# Patient Record
Sex: FEMALE | Race: BLACK OR AFRICAN AMERICAN | Employment: OTHER | URBAN - METROPOLITAN AREA
[De-identification: names, ages, dates, MRNs, and addresses within clinical notes are randomized per-mention and may not be internally consistent; named-entity substitution may affect disease eponyms.]

---

## 2021-03-02 ENCOUNTER — IMMUNIZATION (OUTPATIENT)
Dept: PRIMARY CARE CLINIC | Age: 66
End: 2021-03-02
Payer: MEDICARE

## 2021-03-02 PROCEDURE — 91301 COVID-19, MODERNA VACCINE 100MCG/0.5ML DOSE: CPT | Performed by: FAMILY MEDICINE

## 2021-03-02 PROCEDURE — 0011A COVID-19, MODERNA VACCINE 100MCG/0.5ML DOSE: CPT | Performed by: FAMILY MEDICINE

## 2021-03-30 ENCOUNTER — IMMUNIZATION (OUTPATIENT)
Dept: PRIMARY CARE CLINIC | Age: 66
End: 2021-03-30
Payer: MEDICARE

## 2021-03-30 PROCEDURE — 0012A COVID-19, MODERNA VACCINE 100MCG/0.5ML DOSE: CPT | Performed by: FAMILY MEDICINE

## 2021-03-30 PROCEDURE — 91301 COVID-19, MODERNA VACCINE 100MCG/0.5ML DOSE: CPT | Performed by: FAMILY MEDICINE

## 2022-04-17 ENCOUNTER — HOSPITAL ENCOUNTER (EMERGENCY)
Age: 67
Discharge: HOME | End: 2022-04-17
Attending: EMERGENCY MEDICINE
Payer: MEDICARE

## 2022-04-17 VITALS
RESPIRATION RATE: 16 BRPM | HEART RATE: 58 BPM | TEMPERATURE: 97.6 F | SYSTOLIC BLOOD PRESSURE: 140 MMHG | DIASTOLIC BLOOD PRESSURE: 83 MMHG | OXYGEN SATURATION: 98 %

## 2022-04-17 DIAGNOSIS — S61.218A LACERATION OF MIDDLE FINGER WITHOUT FOREIGN BODY WITHOUT DAMAGE TO NAIL, UNSPECIFIED LATERALITY, INITIAL ENCOUNTER: Primary | ICD-10-CM

## 2022-04-17 PROCEDURE — 12001 RPR S/N/AX/GEN/TRNK 2.5CM/<: CPT

## 2022-04-17 PROCEDURE — 99283 EMERGENCY DEPT VISIT LOW MDM: CPT

## 2022-04-17 RX ORDER — CEPHALEXIN 500 MG/1
500 CAPSULE ORAL 3 TIMES DAILY
Qty: 15 CAPSULE | Refills: 0 | Status: SHIPPED | OUTPATIENT
Start: 2022-04-17 | End: 2022-04-17 | Stop reason: SDUPTHER

## 2022-04-17 RX ORDER — CEPHALEXIN 500 MG/1
500 CAPSULE ORAL 3 TIMES DAILY
Qty: 15 CAPSULE | Refills: 0 | Status: SHIPPED | OUTPATIENT
Start: 2022-04-17 | End: 2022-04-22

## 2022-04-17 NOTE — ED NOTES
Finger wrapped with gauze and adaptic. Discharge instruction reviewed with pt.      Aimee Flowers, KHUSHBOO  04/17/22 2963

## 2022-04-17 NOTE — Clinical Note
Adelso Kirkland was seen and treated in our emergency department on 4/17/2022. She may return to work on 04/19/2022. If you have any questions or concerns, please don't hesitate to call.       Prashanth De La Cruz MD

## 2022-04-17 NOTE — ED PROVIDER NOTES
[address] ED      CHIEF COMPLAINT  Laceration (patient states she cut her middle finger on a kitchen knife this morning while cutting potatoes)       HISTORY OF PRESENT ILLNESS  Fariba Gallego is a 78 y.o. female with history of diabetes, hypertension, and hyperlipidemia who presents to the emergency department for evaluation of laceration. Patient reports she was cutting potatoes and accidentally cut the right middle finger on the volar surface with a kitchen knife. Says it was a clean knife. Says she was concerned because the bleeding would stop and came for evaluation. Denies taking blood thinners or aspirin. Denies any other injuries. Does not recall the Tdap but thinks it was within the past few years. No other complaints, modifying factors or associated symptoms. I have reviewed the following from the nursing documentation. Past Medical History:   Diagnosis Date    Diabetes mellitus ([facility] [address]     Hyperlipidemia     Hypertension      Past Surgical History:   Procedure Laterality Date    UTERINE FIBROID SURGERY      WISDOM TOOTH EXTRACTION       History reviewed. No pertinent family history.   Social History     Socioeconomic History    Marital status:      Spouse name: Not on file    Number of children: Not on file    Years of education: Not on file    Highest education level: Not on file   Occupational History    Not on file   Tobacco Use    Smoking status: Never Smoker    Smokeless tobacco: Never Used   Substance and Sexual Activity    Alcohol use: No    Drug use: No    Sexual activity: Not on file   Other Topics Concern    Not on file   Social History Narrative    Not on file     Social Determinants of Health     Financial Resource Strain:     Difficulty of Paying Living Expenses: Not on file   Food Insecurity:     Worried About Running Out of Food in the Last Year: Not on file    Ankita of Food in the Last Year: Not on file   Transportation Needs:     Lack of Transportation (Medical): Not on file    Lack of Transportation (Non-Medical): Not on file   Physical Activity:     Days of Exercise per Week: Not on file    Minutes of Exercise per Session: Not on file   Stress:     Feeling of Stress : Not on file   Social Connections:     Frequency of Communication with Friends and Family: Not on file    Frequency of Social Gatherings with Friends and Family: Not on file    Attends Protestant Services: Not on file    Active Member of [address] [place] [address] [employer] or Organizations: Not on file    Attends Club or Organization Meetings: Not on file    Marital Status: Not on file   Intimate Partner Violence:     Fear of Current or Ex-Partner: Not on file    Emotionally Abused: Not on file    Physically Abused: Not on file    Sexually Abused: Not on file   Housing Stability:     Unable to Pay for Housing in the Last Year: Not on file    Number of [place] in the Last Year: Not on file    Unstable Housing in the Last Year: Not on file     No current facility-administered medications for this encounter. Current Outpatient Medications   Medication Sig Dispense Refill    cephALEXin (KEFLEX) 500 MG capsule Take 1 capsule by mouth 3 times daily for 5 days 15 capsule 0    pravastatin (PRAVACHOL) 10 MG tablet Take 10 mg by mouth daily      metFORMIN (GLUCOPHAGE) 1000 MG tablet Take 1,000 mg by mouth 2 times daily (with meals)      ibuprofen (ADVIL;MOTRIN) 800 MG tablet Take 1 tablet by mouth every 8 hours as needed for Pain 30 tablet 0    POTASSIUM CHLORIDE by Does not apply route.  LISINOPRIL PO Take  by mouth. No Known Allergies    REVIEW OF SYSTEMS  10 systems reviewed, pertinent positives per HPI otherwise noted to be negative. PHYSICAL EXAM  BP (!) 140/83   Pulse 58   Temp 97.6 °F (36.4 °C) (Temporal)   Resp 16   SpO2 98%    GENERAL APPEARANCE: Awake and alert. No acute distress. HENT: Normocephalic. Atraumatic. No facial droop. LUNGS: Respirations unlabored. Speaking comfortably in full sentences. EXTREMITIES: 1cm v shaped laceration of the right middle finger over the distal volar tip. No active bleeding. No subungual hematoma. No obvious injury to the nailbed. SKIN: Warm and dry. No acute rashes. NEUROLOGICAL: Alert and oriented. No gross facial drooping. Answering questions appropriately. Moving all extremities. PSYCHIATRIC: Pleasant. Normal mood and affect. LABS  No results found for this visit on 04/17/22. RADIOLOGY  No results found. ED COURSE/MDM  Patient seen and evaluated. At presentation, patient was awake, alert, afebrile, hemodynamically stable, and satting well on room air. Per chart review, patient's last Tdap was in 2019 and did not require a booster for today's visit. She was noted to have a 1 cm V-shaped laceration over the volar tip of the right middle digit with no associated nailbed injury. She had good cap refill of the nailbed, intact sensation over radial/median/ulnar nerve distribution, 2+ radial pulses bilaterally, and intact flexion and extension at the right middle finger over the DIP and PIP. The laceration is superficial and do not think x-ray is necessary for associated fracture or FB. The laceration was copiously irrigated and repaired as below. She was offered lidocaine injection but opted for repaired with topical lidocaine. She was given wound care instructions. Discussed the importance of following up with PCP or returning to the emergency department if any sign of infection. she was given a prescription of Keflex for prophylaxis. She was instructed to follow-up with her PCP for further evaluation and treatment. Patient discharged home with strict return precautions. PROCEDURE:  LACERATION REPAIR  Arlen Chilel or their surrogate had an opportunity to ask questions, and the risks, benefits, and alternatives were discussed. The wound was prepped and draped to maintain a sterile field.  Topical lidocaine was applied to anesthetize the wound. It was copiously irrigated. It was explored to its depth in a bloodless field with no sign of tendon, nerve, or vascular injury. No foreign bodies were identified. It was closed with 4-0 chromic. There were no complications during the procedure. Pt was seen during the [place] 19 pandemic. Appropriate PPE worn by ME during patient encounters. Patient was cared for during a time with constrained hospital bed capacity with nationwide stress on resources and staffing. During the patient's ED course, the patient was given:  Medications - No data to display     CLINICAL IMPRESSION  1. Laceration of middle finger without foreign body without damage to nail, unspecified laterality, initial encounter        Blood pressure (!) 140/83, pulse 58, temperature 97.6 °F (36.4 °C), temperature source Temporal, resp. rate 16, SpO2 98 %. Reji Irving was discharged home in stable condition. Patient was given scripts for the following medications. I counseled patient how to take these medications. Discharge Medication List as of 4/17/2022 10:19 AM          Follow-up with:  MD Poonam [address]  [phone]    In 2 days        DISCLAIMER: This chart was created using Dragon dictation software. Efforts were made by me to ensure accuracy, however some errors may be present due to limitations of this technology and occasionally words are not transcribed correctly.         Suzanna Garrison MD  04/18/22 1243